# Patient Record
Sex: FEMALE | Race: WHITE | Employment: UNEMPLOYED | URBAN - METROPOLITAN AREA
[De-identification: names, ages, dates, MRNs, and addresses within clinical notes are randomized per-mention and may not be internally consistent; named-entity substitution may affect disease eponyms.]

---

## 2024-05-18 ENCOUNTER — HOSPITAL ENCOUNTER (INPATIENT)
Age: 24
LOS: 3 days | Discharge: HOME | DRG: 885 | End: 2024-05-21
Attending: EMERGENCY MEDICINE | Admitting: PSYCHIATRY & NEUROLOGY
Payer: COMMERCIAL

## 2024-05-18 DIAGNOSIS — R45.851 DEPRESSION WITH SUICIDAL IDEATION: Primary | ICD-10-CM

## 2024-05-18 DIAGNOSIS — F32.A DEPRESSION WITH SUICIDAL IDEATION: Primary | ICD-10-CM

## 2024-05-18 PROBLEM — F33.2 MAJOR DEPRESSIVE DISORDER, RECURRENT, SEVERE WITHOUT PSYCHOTIC FEATURES (HCC): Status: ACTIVE | Noted: 2024-05-18

## 2024-05-18 LAB
ALBUMIN SERPL-MCNC: 4.6 G/DL (ref 3.5–5.2)
ALP SERPL-CCNC: 52 U/L (ref 35–104)
ALT SERPL-CCNC: 19 U/L (ref 5–33)
ANION GAP SERPL CALCULATED.3IONS-SCNC: 12 MMOL/L (ref 9–17)
APAP SERPL-MCNC: <5 UG/ML (ref 10–30)
AST SERPL-CCNC: 40 U/L
BILIRUB SERPL-MCNC: 0.5 MG/DL (ref 0.3–1.2)
BUN SERPL-MCNC: 12 MG/DL (ref 6–20)
CALCIUM SERPL-MCNC: 9.9 MG/DL (ref 8.6–10.4)
CHLORIDE SERPL-SCNC: 102 MMOL/L (ref 98–107)
CO2 SERPL-SCNC: 25 MMOL/L (ref 20–31)
CREAT SERPL-MCNC: 0.7 MG/DL (ref 0.5–0.9)
ERYTHROCYTE [DISTWIDTH] IN BLOOD BY AUTOMATED COUNT: 14.8 % (ref 11.5–14.9)
ETHANOL PERCENT: <0.01 %
ETHANOLAMINE SERPL-MCNC: <10 MG/DL (ref 0–0.08)
GFR, ESTIMATED: >90 ML/MIN/1.73M2
GLUCOSE SERPL-MCNC: 105 MG/DL (ref 70–99)
HCT VFR BLD AUTO: 42.5 % (ref 36–46)
HGB BLD-MCNC: 13.8 G/DL (ref 12–16)
MCH RBC QN AUTO: 29.8 PG (ref 26–34)
MCHC RBC AUTO-ENTMCNC: 32.4 G/DL (ref 31–37)
MCV RBC AUTO: 91.8 FL (ref 80–100)
PLATELET # BLD AUTO: 364 K/UL (ref 150–450)
PMV BLD AUTO: 7.8 FL (ref 6–12)
POTASSIUM SERPL-SCNC: 4 MMOL/L (ref 3.7–5.3)
PROT SERPL-MCNC: 8.2 G/DL (ref 6.4–8.3)
RBC # BLD AUTO: 4.64 M/UL (ref 4–5.2)
SALICYLATES SERPL-MCNC: <1 MG/DL (ref 3–10)
SODIUM SERPL-SCNC: 139 MMOL/L (ref 135–144)
TSH SERPL DL<=0.05 MIU/L-ACNC: 4.8 UIU/ML (ref 0.3–5)
WBC OTHER # BLD: 9.4 K/UL (ref 3.5–11)

## 2024-05-18 PROCEDURE — G0480 DRUG TEST DEF 1-7 CLASSES: HCPCS

## 2024-05-18 PROCEDURE — 80143 DRUG ASSAY ACETAMINOPHEN: CPT

## 2024-05-18 PROCEDURE — 6370000000 HC RX 637 (ALT 250 FOR IP): Performed by: PSYCHIATRY & NEUROLOGY

## 2024-05-18 PROCEDURE — 99285 EMERGENCY DEPT VISIT HI MDM: CPT

## 2024-05-18 PROCEDURE — 84443 ASSAY THYROID STIM HORMONE: CPT

## 2024-05-18 PROCEDURE — APPSS60 APP SPLIT SHARED TIME 46-60 MINUTES: Performed by: NURSE PRACTITIONER

## 2024-05-18 PROCEDURE — 80179 DRUG ASSAY SALICYLATE: CPT

## 2024-05-18 PROCEDURE — 85027 COMPLETE CBC AUTOMATED: CPT

## 2024-05-18 PROCEDURE — 36415 COLL VENOUS BLD VENIPUNCTURE: CPT

## 2024-05-18 PROCEDURE — 99223 1ST HOSP IP/OBS HIGH 75: CPT | Performed by: PSYCHIATRY & NEUROLOGY

## 2024-05-18 PROCEDURE — 1240000000 HC EMOTIONAL WELLNESS R&B

## 2024-05-18 PROCEDURE — 80053 COMPREHEN METABOLIC PANEL: CPT

## 2024-05-18 RX ORDER — ESCITALOPRAM OXALATE 10 MG/1
15 TABLET ORAL DAILY
Status: DISCONTINUED | OUTPATIENT
Start: 2024-05-18 | End: 2024-05-21 | Stop reason: HOSPADM

## 2024-05-18 RX ORDER — HYDROXYZINE 50 MG/1
50 TABLET, FILM COATED ORAL 3 TIMES DAILY PRN
Status: DISCONTINUED | OUTPATIENT
Start: 2024-05-18 | End: 2024-05-21 | Stop reason: HOSPADM

## 2024-05-18 RX ORDER — POLYETHYLENE GLYCOL 3350 17 G
2 POWDER IN PACKET (EA) ORAL
Status: DISCONTINUED | OUTPATIENT
Start: 2024-05-18 | End: 2024-05-21 | Stop reason: HOSPADM

## 2024-05-18 RX ORDER — LANOLIN ALCOHOL/MO/W.PET/CERES
3 CREAM (GRAM) TOPICAL NIGHTLY PRN
Status: DISCONTINUED | OUTPATIENT
Start: 2024-05-18 | End: 2024-05-21 | Stop reason: HOSPADM

## 2024-05-18 RX ORDER — ESCITALOPRAM OXALATE 10 MG/1
10 TABLET ORAL DAILY
Status: ON HOLD | COMMUNITY
End: 2024-05-21 | Stop reason: HOSPADM

## 2024-05-18 RX ORDER — MAGNESIUM HYDROXIDE/ALUMINUM HYDROXICE/SIMETHICONE 120; 1200; 1200 MG/30ML; MG/30ML; MG/30ML
30 SUSPENSION ORAL EVERY 6 HOURS PRN
Status: DISCONTINUED | OUTPATIENT
Start: 2024-05-18 | End: 2024-05-21 | Stop reason: HOSPADM

## 2024-05-18 RX ORDER — POLYETHYLENE GLYCOL 3350 17 G/17G
17 POWDER, FOR SOLUTION ORAL DAILY PRN
OUTPATIENT
Start: 2024-05-18

## 2024-05-18 RX ORDER — POLYETHYLENE GLYCOL 3350 17 G/17G
17 POWDER, FOR SOLUTION ORAL DAILY PRN
Status: DISCONTINUED | OUTPATIENT
Start: 2024-05-18 | End: 2024-05-21 | Stop reason: HOSPADM

## 2024-05-18 RX ORDER — HYDROXYZINE 50 MG/1
50 TABLET, FILM COATED ORAL 3 TIMES DAILY PRN
OUTPATIENT
Start: 2024-05-18

## 2024-05-18 RX ORDER — MAGNESIUM HYDROXIDE/ALUMINUM HYDROXICE/SIMETHICONE 120; 1200; 1200 MG/30ML; MG/30ML; MG/30ML
30 SUSPENSION ORAL EVERY 6 HOURS PRN
OUTPATIENT
Start: 2024-05-18

## 2024-05-18 RX ORDER — TRAZODONE HYDROCHLORIDE 50 MG/1
50 TABLET ORAL NIGHTLY PRN
Status: DISCONTINUED | OUTPATIENT
Start: 2024-05-18 | End: 2024-05-21 | Stop reason: HOSPADM

## 2024-05-18 RX ORDER — ACETAMINOPHEN 325 MG/1
650 TABLET ORAL EVERY 4 HOURS PRN
Status: DISCONTINUED | OUTPATIENT
Start: 2024-05-18 | End: 2024-05-21 | Stop reason: HOSPADM

## 2024-05-18 RX ORDER — IBUPROFEN 400 MG/1
400 TABLET ORAL EVERY 6 HOURS PRN
Status: DISCONTINUED | OUTPATIENT
Start: 2024-05-18 | End: 2024-05-21 | Stop reason: HOSPADM

## 2024-05-18 RX ORDER — ACETAMINOPHEN 325 MG/1
650 TABLET ORAL EVERY 4 HOURS PRN
Status: DISCONTINUED | OUTPATIENT
Start: 2024-05-18 | End: 2024-05-18

## 2024-05-18 RX ORDER — IBUPROFEN 400 MG/1
400 TABLET ORAL EVERY 6 HOURS PRN
OUTPATIENT
Start: 2024-05-18

## 2024-05-18 RX ADMIN — ESCITALOPRAM OXALATE 15 MG: 10 TABLET ORAL at 18:45

## 2024-05-18 ASSESSMENT — LIFESTYLE VARIABLES
HOW MANY STANDARD DRINKS CONTAINING ALCOHOL DO YOU HAVE ON A TYPICAL DAY: 1 OR 2
HOW OFTEN DO YOU HAVE A DRINK CONTAINING ALCOHOL: 2-4 TIMES A MONTH

## 2024-05-18 ASSESSMENT — PATIENT HEALTH QUESTIONNAIRE - PHQ9
SUM OF ALL RESPONSES TO PHQ9 QUESTIONS 1 & 2: 2
SUM OF ALL RESPONSES TO PHQ QUESTIONS 1-9: 2
2. FEELING DOWN, DEPRESSED OR HOPELESS: SEVERAL DAYS
1. LITTLE INTEREST OR PLEASURE IN DOING THINGS: SEVERAL DAYS
SUM OF ALL RESPONSES TO PHQ QUESTIONS 1-9: 2

## 2024-05-18 ASSESSMENT — PAIN - FUNCTIONAL ASSESSMENT: PAIN_FUNCTIONAL_ASSESSMENT: 0-10

## 2024-05-18 ASSESSMENT — SLEEP AND FATIGUE QUESTIONNAIRES
DO YOU USE A SLEEP AID: NO
DO YOU HAVE DIFFICULTY SLEEPING: NO
AVERAGE NUMBER OF SLEEP HOURS: 8

## 2024-05-18 ASSESSMENT — PAIN SCALES - GENERAL: PAINLEVEL_OUTOF10: 2

## 2024-05-18 NOTE — CARE COORDINATION
BHI Biopsychosocial Assessment    Current Level of Psychosocial Functioning     Independent   Dependent    Minimal Assist X    Psychosocial High Risk Factors (check all that apply)    Unable to obtain meds   Chronic illness/pain    Substance abuse   Lack of Family Support   Financial stress   Isolation X  Inadequate Community Resources  Suicide attempt(s)   Not taking medications   Victim of crime   Developmental Delay  Unable to manage personal needs  X  Age 65 or older   Homeless  No transportation   Readmission within 30 days  Unemployment  Traumatic Event  Psychiatric Advanced Directives: none reported     Family to Involve in Treatment: Patient reports that both aof her parents are supportive and involved in her care.     Sexual Orientation:  GILA    Patient Strengths: family support, linked with outpatient Treatment, employed, insurance     Patient Barriers: recent increase in symptoms of Depression, presents on admission with suicidal ideation.     Opiate Education Provided: N/A Patient denies and does not have a documented history of Opiate or Heroin use/abuse. Patient denies any Alcohol or Illicit drug use. There was not a drug screen completed upon admission     CMHC/mental health history: Sees Dr Norberto Smith MD [facility] [phone], stable housing in [place], [place] with parents, Current student at [employer]     Plan of Care   medication management, group/individual therapies, family meetings, psycho -education, treatment team meetings to assist with stabilization    Initial Discharge Plan:  Patient reports a plan to return to her home in [place] at discharge. She also reports a plan to continue outpatient treatment with Psychiatrist Norberto Smith MD at [facility]     Clinical Summary:  patient presents on admission with suicidal ideation with a plan to overdose on pills.  Pt has hx of cutting and was admitted to [employer]

## 2024-05-18 NOTE — ED NOTES
Provisional Diagnosis:   Depression with suicidal ideations     Psychosocial and Contextual Factors:   Patient lives with parents.  (homelessness, barriers to treatment)    C-SSRS Summary:      Patient: X  Family:   Agency:         Present Suicidal Behavior:  X    Verbal:  Patient reports suicidal ideations with a plan to overdose on pills.     Attempt:     Past Suicidal Behavior: X    Verbal: Patient reports having suicidal ideations in the past     Attempt:         Substance Abuse: Patient denies       Self-Injurious/Self-Mutilation: Patient cuts. Patient has cut on her thighs and wrists.       Violence Current or Past: None noted.           Protective Factors:    Patient has housing. Patient has insurance. Patient is linked with outpatient provider and compliant with treatment. Patient has employment.       Risk Factors:    Patient has poor coping skills.       Clinical Summary:    Patient is a 24 year old female that is brought to the ED today by her parents on a voluntary status for suicidal ideations.     Patient is calm and appropriate upon her arrival. Patient reports she has suffered from depression for several years. Patient reports she has had suicidal ideations in the past but never developed a plan or acted on these thoughts. Patient reports feeling increasingly depressed recently after losing her grandparents. Patient reports last night she began having suicidal ideations with a plan to overdose on pills in her home. Patient reports feeling worried that she will follow through with this plan. Patient denies homicidal ideations. Patient denies auditory or visual hallucinations.     Patient reports a history of self harm by cutting for several years. Patient does have superficial lacerations on her thighs and left arm. Patient reports she was hospitalized as a teenager at [facility] for the self harm. Patient reports since then she has been linked with outpatient mental health services through [facility].

## 2024-05-18 NOTE — BH NOTE
[facility] [place]  Admission Note     Admission Type:   Admission Type: Voluntary    Reason for admission:  Reason for Admission: Increased stressors; full time student, 4 jobs. Lost grandparents recently and feels tremendious guilt for not being there for them. Suicidal ideaiton with a plant to OD on pills. History of cutting      Addictive Behavior:   Addictive Behavior  In the Past 3 Months, Have You Felt or Has Someone Told You That You Have a Problem With  : None    Medical Problems:   History reviewed. No pertinent past medical history.    Status EXAM:  Mental Status and Behavioral Exam  Normal: Yes  Level of Assistance: Independent/Self  Facial Expression: Sad, Worried  Affect: Appropriate  Level of Consciousness: Alert  Frequency of Checks: 4 times per hour, close  Mood:Normal: No  Mood: Depressed, Anxious, Worthless, low self-esteem, Sad  Motor Activity:Normal: Yes  Eye Contact: Good  Observed Behavior: Friendly, Cooperative  Sexual Misconduct History: Current - no  Preception: [place] to person, [place] to time, [place] to place, [place] to situation  Attention:Normal: Yes  Thought Processes: Unremarkable  Thought Content:Normal: Yes  Depression Symptoms: No problems reported or observed.  Anxiety Symptoms: No problems reported or observed.  Bita Symptoms: No problems reported or observed.  Hallucinations: None  Delusions: No  Memory:Normal: Yes  Insight and Judgment: Yes  Insight and Judgment: Poor insight, Poor judgment    Tobacco Screening:  Practical Counseling, on admission, chemo X, if applicable and completed (first 3 are required if patient doesn't refuse):            ( ) Recognizing danger situations (included triggers and roadblocks)                    ( ) Coping skills (new ways to manage stress,relaxation techniques, changing routine, distraction)                                                           ( ) Basic information about quitting (benefits of quitting, techniques in how to

## 2024-05-18 NOTE — PROGRESS NOTES
Behavioral Services  Medicare Certification Upon Admission    I certify that this patient's inpatient psychiatric hospital admission is medically necessary for:    [x] (1) Treatment which could reasonably be expected to improve this patient's condition,       [x] (2) Or for diagnostic study;     AND     [x](2) The inpatient psychiatric services are provided while the individual is under the care of a physician and are included in the individualized plan of care.    Estimated length of stay/service 2-9 days    Plan for post-hospital care -outpatient care    Electronically signed by BING POWELL MD on 5/18/2024 at 3:56 PM

## 2024-05-18 NOTE — ED TRIAGE NOTES
Mode of arrival (squad #, walk in, police, etc) : walk in        Chief complaint(s): Mental Health Problem        Arrival Note (brief scenario, treatment PTA, etc).: pt states she recently suffered the loss of her grandparents and her friend last night was talking about how their grandparent was sick and it triggered her to cut today. Pt has hx of cutting and was admitted to [facility] in 2019 an still followed up there today. Pt reports she has been taking her lexapro. Pt denies drug use. Reports social ETOH use. Pt denies HI, AH, or VH. Pt reports having a good job, good friends and parents and will graduated from [employer] in the fall wit ha degree in biology and plans to become a . Pt reports she is in summer classes as well which are adding to her stress.        C= \"Have you ever felt that you should Cut down on your drinking?\"  No  A= \"Have people Annoyed you by criticizing your drinking?\"  No  G= \"Have you ever felt bad or Guilty about your drinking?\"  No  E= \"Have you ever had a drink as an Eye-opener first thing in the morning to steady your nerves or to help a hangover?\"  No      Deferred []      Reason for deferring: N/A    *If yes to two or more: probable alcohol abuse.*

## 2024-05-18 NOTE — ED PROVIDER NOTES
eMERGENCY dEPARTMENT eNCOUnter      Pt Name: Sophia Shultz  MRN: [id number]  Birthdate 2000  Date of evaluation: 24      CHIEF COMPLAINT       Chief Complaint   Patient presents with    Mental Health Problem         HISTORY OF PRESENT ILLNESS    Sophia Shultz is a 24 y.o. female who presents complaining of depression.  Patient states that she has a history of depression and takes Lexapro.  Patient states that she is under a lot of stress with school working for different jobs and then her grandparents just recently .  Patient states that she is a cutter but feels like she has not done anything worse than normal but she has been doing more frequently.  Patient states that she does not have any hallucinations or drug abuse.  Patient denies somatic complaints.  Patient states she does have a plan to overdose on pills.  She has been hospitalized when she was a teenager.      REVIEW OF SYSTEMS       Review of Systems   Constitutional:  Positive for appetite change. Negative for activity change, chills, diaphoresis and fever.   HENT:  Negative for congestion, ear pain, facial swelling, nosebleeds, rhinorrhea, sinus pressure, sore throat and trouble swallowing.    Eyes:  Negative for pain, discharge and redness.   Respiratory:  Negative for cough, chest tightness, shortness of breath and wheezing.    Cardiovascular:  Negative for chest pain, palpitations and leg swelling.   Gastrointestinal:  Negative for abdominal pain, blood in stool, constipation, diarrhea, nausea and vomiting.   Genitourinary:  Negative for difficulty urinating, dysuria, flank pain, frequency, genital sores and hematuria.   Musculoskeletal:  Negative for arthralgias, back pain, gait problem, joint swelling, myalgias and neck pain.   Skin:  Negative for color change, pallor, rash and wound.   Neurological:  Negative for dizziness, tremors, seizures, syncope, speech difficulty, weakness, numbness and headaches.   Psychiatric/Behavioral:  Positive

## 2024-05-18 NOTE — CARE COORDINATION
Psychosocial Assessment was unable to be completed on this date and will be completed on Tab May 19, 2024.

## 2024-05-18 NOTE — PROGRESS NOTES
I independently saw and evaluated the patient.  I reviewed the  documentation above    .  Any additional comments or changes to the   documentation are stated below otherwise agree with assessment.      No results found for: \"QTC\"    The patient was seen face-to-face.  The patient reports worsening depressive symptoms because of the loss of both of her grandparents in the last 2 years.  Her grandfather  recently.  The patient has been on antidepressants since 2019.  She has been on Prozac which was unhelpful.  She has recently been on Lexapro which she had found helpful.  She denies using any marijuana.  The patient was screened for bipolar disorder and that she has a family history of bipolar disorder in her mother and occasional mood swings, there is no clear history of manic symptoms.  The patient's Lexapro will be increased to 15 mg daily.     PLAN  Medications as noted above  Attempt to develop insight  Psycho-education conducted.  Supportive Therapy conducted.  Follow-up daily while on inpatient unit    Electronically signed by BING POWELL MD on 24 at 4:04 PM EDT

## 2024-05-19 PROCEDURE — 99222 1ST HOSP IP/OBS MODERATE 55: CPT | Performed by: INTERNAL MEDICINE

## 2024-05-19 PROCEDURE — 99232 SBSQ HOSP IP/OBS MODERATE 35: CPT

## 2024-05-19 PROCEDURE — 6370000000 HC RX 637 (ALT 250 FOR IP): Performed by: PSYCHIATRY & NEUROLOGY

## 2024-05-19 PROCEDURE — 1240000000 HC EMOTIONAL WELLNESS R&B

## 2024-05-19 RX ADMIN — ESCITALOPRAM OXALATE 15 MG: 10 TABLET ORAL at 10:35

## 2024-05-19 ASSESSMENT — PAIN SCALES - GENERAL: PAINLEVEL_OUTOF10: 0

## 2024-05-19 NOTE — PROGRESS NOTES
Daily Progress Note  5/19/2024    Patient Name: Sophia Shultz    CHIEF COMPLAINT:  Depression with SI, plan to overdose/cut self          SUBJECTIVE:      Patient is seen today for a follow up assessment. Patient seen face to face in day area. Reviewed labs and vitals, all WNL. Compliant with scheduled medications and behaviorally in control. She states she had \"normal sleep\" last night, improved from prior. Denies urges of self-harm and endorses calming of her suicidal ideations. She states feeling less depressed but more so anxious while being in this environment. She endorses self-talk, but denies AVH and paranoia. She believes being on Lexapro has helped her. She endorses still feeling guilt over her grandparents' passing but is working on her coping skills with this. Denies homicidal ideations. States improving appetite and mood. She is very open to attending groups and learning new coping mechanisms. Patient appeared very calm, pleasant, and willing to work through her issues during our visit today. Patient seems to be improving but with much work left to be had.         Appetite:  [] Adequate/Unchanged  [x] Increased  [] Decreased      Sleep:       [] Adequate/Unchanged  [x] Fair  [] Poor      Group Attendance on Unit:   [x] Yes   [] Selectively    [] No    Compliant with scheduled medications: [x] Yes  [] No    Received emergency medications in past 24 hrs: [] Yes   [x] No    Medication Side Effects: Denies         Mental Status Exam  Level of consciousness: Alert and awake   Appearance: Appropriate attire for setting, seated in chair, with good  grooming and hygiene   Behavior/Motor: Approachable, engages with interviewer, no psychomotor abnormalities   Attitude toward examiner: Cooperative, attentive, good eye contact  Speech: spontaneous, normal rate, normal volume, and well articulated   Mood:  anxious  Affect: mood congruent  Thought processes: linear, goal directed, and coherent   Thought content:

## 2024-05-19 NOTE — PLAN OF CARE
Problem: Depression  Goal: Will be euthymic at discharge  Description: INTERVENTIONS:  1. Administer medication as ordered  2. Provide emotional support via 1:1 interaction with staff  3. Encourage involvement in milieu/groups/activities  4. Monitor for social isolation  5/19/2024 1139 by Peggy Flanagan RN  Outcome: Progressing   1:1 with pt x ten minutes.  Pt encouraged to attend unit programming and interact with peers and staff.  Pt also encouraged to tend to hygiene and ADLs.  Pt encouraged to discuss feelings with staff and feedback and reassurance provided.    Pt denies thoughts of self harm and is agreeable to seeking out should thoughts of self harm arise.  Safe environment maintained.  Q15 minute checks for safety cont per unit policy.  Will cont to monitor for safety and provides support and reassurance as needed.    Pt is controlled in behaviors. Compliant with medications. Depressed mood with some anxiety. Pt seclusive to room for long intervals.

## 2024-05-19 NOTE — GROUP NOTE
Group Therapy Note    Date: 5/19/2024    Group Start Time: 0900  Group End Time: 0930  Group Topic: Group Documentation    [facility] Radha Quinn LPN        Group Therapy Note    Attendees: 11/23       Patient's Goal:  inspire    Notes:  educate    Status After Intervention:  Improved    Participation Level: Minimal    Participation Quality: Appropriate      Speech:  normal      Thought Process/Content: Logical      Affective Functioning: Congruent      Mood: anxious      Level of consciousness:  Alert      Response to Learning: Progressing to goal      Endings: None Reported    Modes of Intervention: Education      Discipline Responsible: Licensed Practical Nurse      Signature:  Radha Velasquez LPN

## 2024-05-19 NOTE — PROGRESS NOTES

## 2024-05-19 NOTE — PROGRESS NOTES
Daily Progress Note  5/19/2024    Patient Name: Sophia Shultz    CHIEF COMPLAINT:  Depression with SI, plan to overdose/cut self          SUBJECTIVE:      Patient is seen today for a follow up assessment. Patient seen face to face in day area, declined need for private. Reviewed labs and vitals, all WNL. Compliant with scheduled medications and behaviorally in control. She states she had \"normal sleep\" last night, improved from prior. Denies urges of self-harm and endorses calming of her suicidal ideations. She states started to notice feeling less depressed and more so anxious while being in this environment. She denies AVH and paranoia. She believes titration of Lexapro has helped her slightly and notices some improvement in mood. She endorses still feeling guilt over her grandparents' passing but is working on her coping skills with this. Denies homicidal ideations. States improving appetite. She is very open to attending groups and learning new coping mechanisms. Patient appeared very calm, pleasant, and willing to work through her issues during our visit today. Patient seems to be improving but with much work left to be had thus not a candidate for low level of care as she is unable to contract for safety of the community.    Appetite:  [] Adequate/Unchanged  [x] Increased  [] Decreased      Sleep:       [] Adequate/Unchanged  [x] Fair  [] Poor      Group Attendance on Unit:   [x] Yes   [] Selectively    [] No    Compliant with scheduled medications: [x] Yes  [] No    Received emergency medications in past 24 hrs: [] Yes   [x] No    Medication Side Effects: Denies         Mental Status Exam  Level of consciousness: Alert and awake   Appearance: Appropriate attire for setting, seated in chair, with good  grooming and hygiene   Behavior/Motor: Approachable, engages with interviewer, no psychomotor abnormalities   Attitude toward examiner: Cooperative, attentive, good eye contact  Speech: spontaneous, normal

## 2024-05-19 NOTE — PLAN OF CARE
[facility]  Initial Interdisciplinary Treatment Plan NO      Original treatment plan Date & Time: 5/19/2024   9:21 am    Admission Type:  Admission Type: Voluntary    Reason for admission:   Reason for Admission: Increased stressors; full time student, 4 jobs. Lost grandparents recently and feels tremendious guilt for not being there for them. Suicidal ideaiton with a plant to OD on pills. History of cutting    Estimated Length of Stay:  5-7days  Estimated Discharge Date: to be determined by physician    PATIENT STRENGTHS:  Patient Strengths:   Patient Strengths and Limitations:Limitations: Difficult relationships / poor social skills, Apathetic / unmotivated  Addictive Behavior: Addictive Behavior  In the Past 3 Months, Have You Felt or Has Someone Told You That You Have a Problem With  : None  Medical Problems:History reviewed. No pertinent past medical history.  Status EXAM:Mental Status and Behavioral Exam  Normal: Yes  Level of Assistance: Independent/Self  Facial Expression: Sad  Affect: Appropriate  Level of Consciousness: Alert  Frequency of Checks: 4 times per hour, close  Mood:Normal: No  Mood: Anxious, Depressed, Helpless  Motor Activity:Normal: Yes  Eye Contact: Good  Observed Behavior: Cooperative  Sexual Misconduct History: Current - no  Preception: [place] to person, [place] to time, [place] to place, [place] to situation  Attention:Normal: Yes  Thought Processes: Unremarkable  Thought Content:Normal: Yes  Depression Symptoms: Isolative  Anxiety Symptoms: Generalized  Bita Symptoms: No problems reported or observed.  Hallucinations: None  Delusions: No  Memory:Normal: Yes  Insight and Judgment: No  Insight and Judgment: Poor judgment, Poor insight    EDUCATION:   Learner Progress Toward Treatment Goals: reviewed group plans and strategies for care    Method:group therapy, medication compliance, individualized assessments and care planning    Outcome: needs reinforcement    PATIENT GOALS:

## 2024-05-19 NOTE — H&P
[facility]  Waldemar Kay MD; Erwin Avery MD, Derek Hollingsworth MD, Beth Osman MD, Jw Avila MD; Elva Cam MD    [facility] Medicine   IN-PATIENT SERVICE   [facility]     HISTORY AND PHYSICAL EXAMINATION            Date:   5/19/2024  Patient name:  Sophia Shultz  Date of admission:  5/18/2024  1:20 PM  MRN:   [id number]  Account:  [id number]  YOB: 2000  PCP:    No primary care provider on file.  Room:   [address] [place] [address]  Code Status:    Full Code      Chief Complaint:     Suicidal /Ac Psychosis    History Obtained From:     Patient/EMR/bedside RN     History of Present Illness:   24-year-old female with underlying history of anxiety, depression, admitted for psychosis ideations, has multiple scratches on the hands, does not look infected      Past Medical History:     History reviewed. No pertinent past medical history.     Past Surgical History:     History reviewed. No pertinent surgical history.     Medications Prior to Admission:     Prior to Admission medications    Medication Sig Start Date End Date Taking? Authorizing Provider   escitalopram (LEXAPRO) 10 MG tablet Take 1 tablet by mouth daily   Yes Provider, MD Epi        Allergies:     Patient has no known allergies.    Social History:     Tobacco:    reports that she has never smoked. She has never used smokeless tobacco.  Alcohol:      reports current alcohol use.  Drug Use:  reports no history of drug use.    Family History:     History reviewed. No pertinent family history.    Review of Systems:     Positive and Negative as described in HPI.    CONSTITUTIONAL:  negative for fevers, chills, sweats, fatigue, weight loss  HEENT:  negative for vision, hearing changes, runny nose, throat pain  RESPIRATORY:  negative for shortness of breath, cough, congestion, wheezing.  CARDIOVASCULAR:  negative for chest pain, 
bruise/bleed easily.      Mental Status Examination:    Level of consciousness: Awake and alert  Appearance:  Appropriate attire, seated in chair, fair grooming   Behavior/Motor: Approachable, engages with interviewer, no psychomotor abnormalities  Attitude toward examiner:  Cooperative, attentive, good eye contact  Speech: Normal rate, volume, and sad tone.  Mood: Depressed, withdrawn  Affect: Mood congruent, tearful  Thought processes: Linear and coherent  Thought content: Active suicidal ideations, with a  current plan or intent, contracts for safety on the unit.               Denies homicidal ideations               Denies hallucinations              Denies delusions              Denies paranoia  Cognition:  Oriented to self, location, time, situation  Concentration: Clinically adequate  Memory: Intact  Insight & Judgment: Poor         DSM-5 Diagnosis    Principal Problem: Major depressive disorder, recurrent, severe without psychotic features (HCC)    Psychosocial and Contextual factors:  Financial   Occupational X  Relationship X  Legal   Living situation   Educational     No past medical history on file.     PATIENT HANDOFF:  Home medications reviewed.   Medication management per attending  Monitor need and frequency of PRN medications.    CONSULT:  [x] Yes [] No  Internal medicine for medical management/medical H&P      Risk Management: close watch per standard protocol      Psychotherapy: participation in milieu and group and individual sessions with Attending Physician,  and Physician Assistant/CNP      Estimated length of stay:  2-14 days      GENERAL PATIENT/FAMILY EDUCATION  Patient will understand basic signs and symptoms, patient will understand benefits/risks and potential side effects from proposed medications, and patient will understand their role in recovery.  Family is active in patient's care.   Patient assets that may be helpful during treatment include: Intent to participate and

## 2024-05-19 NOTE — PLAN OF CARE
Problem: Pain  Goal: Verbalizes/displays adequate comfort level or baseline comfort level  Outcome: Progressing  Note: Patient denies pain. Patient is aware medications can be given upon request. Patient is oriented to notify staff of changes regarding pain. Staff continues to monitor patient.        Problem: Self Harm/Suicidality  Goal: Will have no self-injury during hospital stay  Description: INTERVENTIONS:  1.  Ensure constant observer at bedside with Q15M safety checks  2.  Maintain a safe environment  3.  Secure patient belongings  4.  Ensure family/visitors adhere to safety recommendations  5.  Ensure safety tray has been added to patient's diet order  6.  Every shift and PRN: Re-assess suicidal risk via Frequent Screener    Outcome: Progressing  Note: Patient denies thoughts of self-harm or harm to others during this shift. Staff encouraged patient to notify staff if thoughts of self-harm or harm to others occur. Staff ensure patient safety by intermediate checks for safety every 15 minutes.          Problem: Depression  Goal: Will be euthymic at discharge  Description: INTERVENTIONS:  1. Administer medication as ordered  2. Provide emotional support via 1:1 interaction with staff  3. Encourage involvement in milieu/groups/activities  4. Monitor for social isolation  Outcome: Progressing  Note: Patient displays depression.  Staff ensure safety by providing safety checks on the unit intermittently and every 15 minutes. Patient is encouraged to notify staff if anxiety and depression occurs.

## 2024-05-20 PROCEDURE — 6370000000 HC RX 637 (ALT 250 FOR IP): Performed by: PSYCHIATRY & NEUROLOGY

## 2024-05-20 PROCEDURE — 99232 SBSQ HOSP IP/OBS MODERATE 35: CPT | Performed by: PSYCHIATRY & NEUROLOGY

## 2024-05-20 PROCEDURE — 1240000000 HC EMOTIONAL WELLNESS R&B

## 2024-05-20 PROCEDURE — APPSS30 APP SPLIT SHARED TIME 16-30 MINUTES

## 2024-05-20 RX ADMIN — ESCITALOPRAM OXALATE 15 MG: 10 TABLET ORAL at 08:39

## 2024-05-20 ASSESSMENT — PAIN SCALES - GENERAL: PAINLEVEL_OUTOF10: 0

## 2024-05-20 NOTE — GROUP NOTE
Group Therapy Note    Date: 5/20/2024    Group Start Time: 1100  Group End Time: 1145  Group Topic: Cognitive Skills    Cher Arce CTRS        Group Therapy Note    Attendees: 10/20     Topic: To increase socialization and practice decsion making , and   communication skills        Notes:  Pt was pleasant and cooperative, and participated fully in group task. Pt was   able to practice decsion making, and communication skills independently.         Status After Intervention:  Improved        Participation Level: Active Listener and Interactive r/t  task and topic, supportive        Participation Quality: Appropriate, Attentive, engaged r/t task ,supportive        Speech:  Normal         Thought Process/Content: Logical, linear r/t task .          Affective Functioning: Congruent, brightened         Mood: Cooperative, euthymic          Level of consciousness:  Alert, and Attentive         Response to Learning: Able to verbalize current knowledge/experience, Able to   verbalize/acknowledge new learning, and Progressing to goal         Endings: None Reported      Modes of Intervention: Education, Support, Socialization, and Problem-solving    Discipline Responsible: Psychoeducational Specialist      Signature:  SHANIKA LLAMAS

## 2024-05-20 NOTE — PROGRESS NOTES
Daily Progress Note  5/20/2024    Patient Name: Sophia Shultz    CHIEF COMPLAINT:  Depression with suicidal ideation          SUBJECTIVE:      Patient is seen today for a follow up assessment. Vitals and labs reviewed and appear to be within normal limits.  Sophia is compliant with scheduled Lexapro. She remains behaviorally in control and has not required emergency medications in the past 24 hours.  Sophia is agreeable to assessment in unit sensory room today following lunch.  When asked about events leading up to hospitalization she does report suicidal thoughts and thoughts to self harm.  Sophia reports continued depression and anxiety however notes some improvement in her mood today. She reports that she slept well last night. She denies any issues with her appetite.    Sophia reports improvement in suicidal ideation stating the thoughts are much less intense and severe today.  She does report ongoing thoughts to self-harm however is trying to push them back.  She is nervous she would act on them however is discharged today.  She denies auditory and visual hallucinations. She denies paranoia.  She denies any side effects to recently increased Lexapro.  She reports that she plans to return home with her parents at discharge and states that they are supportive of her.    Appetite:  [x] Normal/Adequate/Unchanged  [] Increased  [] Decreased      Sleep:       [x] Normal/Adequate/Unchanged  [] Fair  [] Poor      Group Attendance on Unit:   [x] Yes  [] Selectively    [] No    Medication Side Effects:  Patient denies any medication side effects at the time of assessment.         Mental Status Exam  Level of consciousness: Alert and awake.   Appearance: Appropriate attire for setting, seated in chair, with fair  grooming and hygiene.   Behavior/Motor: Approachable, calm  Attitude toward examiner: Cooperative, attentive, good eye contact.  Speech: Normal rate, normal volume, normal tone.  Mood:  Patient reports

## 2024-05-20 NOTE — GROUP NOTE
Group Therapy Note    Date: 5/20/2024    Group Start Time: 1430  Group End Time: 1540  Group Topic: Cognitive Skills    [facility] Cher Badillo CTRS        Group Therapy Note    Attendees: 12/18     Patient's Goal: : To increase socialization and practice self expression, and exploring positive   coping skills/stress management outlets and techniques using creative expression and discussion.           Notes:  Pt was pleasant and cooperative, and participated fully in group task. Pt was   able to practice self expression, and exploring positive coping skills/stress management   outlets and techniques using creative expression and discussion, independently.     Pt shared individually ,was supportive of peers,and engaged in group discussion.   Pt was able to relate to some of peers' ideas and experiences.       Status After Intervention:  Improved     Participation Level: Active Listener and Interactive r/t  task and topic, supportive     Participation Quality: Appropriate, Attentive, engaged r/t task ,supportive     Speech:  Normal .      Thought Process/Content: Logical, linear r/t task .       Affective Functioning: Congruent, Brightened      Mood: Cooperative, euthymic       Level of consciousness:  Alert, and Attentive      Response to Learning: Able to verbalize current knowledge/experience, Able to   verbalize/acknowledge new learning, able to verbalize some insight, and Progressing to goal      Endings: None Reported      Modes of Intervention: Education, Support, Socialization, and Problem-solving    Discipline Responsible: Psychoeducational Specialist      Signature:  SHANIKA LLAMAS

## 2024-05-20 NOTE — PLAN OF CARE
Problem: Pain  Goal: Verbalizes/displays adequate comfort level or baseline comfort level  5/19/2024 2124 by Marlyn Newell LPN  Outcome: Progressing  Note: Patient denies pain. Patient is aware medications can be given upon request. Patient is oriented to notify staff of changes regarding pain. Staff continues to monitor patient.        Problem: Self Harm/Suicidality  Goal: Will have no self-injury during hospital stay  Description: INTERVENTIONS:  1.  Ensure constant observer at bedside with Q15M safety checks  2.  Maintain a safe environment  3.  Secure patient belongings  4.  Ensure family/visitors adhere to safety recommendations  5.  Ensure safety tray has been added to patient's diet order  6.  Every shift and PRN: Re-assess suicidal risk via Frequent Screener    5/19/2024 2124 by Marlyn Newell LPN  Outcome: Progressing  Note: Patient denies thoughts of self-harm or harm to others during this shift. Staff encouraged patient to notify staff if thoughts of self-harm or harm to others occur. Staff ensure patient safety by intermediate checks for safety every 15 minutes.          Problem: Depression  Goal: Will be euthymic at discharge  Description: INTERVENTIONS:  1. Administer medication as ordered  2. Provide emotional support via 1:1 interaction with staff  3. Encourage involvement in milieu/groups/activities  4. Monitor for social isolation  5/19/2024 2124 by Marlyn Newell LPN  Outcome: Progressing  Note: Patient endorses depression & anxiety, yet described it as being manageable.  Staff ensure safety by providing safety checks on the unit intermittently and every 15 minutes. Patient is encouraged to notify staff if anxiety and depression occurs.

## 2024-05-20 NOTE — PLAN OF CARE
Problem: Depression  Goal: Will be euthymic at discharge  Description: INTERVENTIONS:  1. Administer medication as ordered  2. Provide emotional support via 1:1 interaction with staff  3. Encourage involvement in milieu/groups/activities  4. Monitor for social isolation  Outcome: Progressing      1:1 with pt x ten minutes.  Pt encouraged to attend unit programming and interact with peers and staff.  Pt also encouraged to tend to hygiene and ADLs.  Pt encouraged to discuss feelings with staff and feedback and reassurance provided.    Pt denies thoughts of self harm and is agreeable to seeking out should thoughts of self harm arise.  Safe environment maintained.  Q15 minute checks for safety cont per unit policy.  Will cont to monitor for safety and provides support and reassurance as needed.    Pt is controlled in behaviors. Compliant with medications. Depressed mood with some anxiety. Pt seclusive to room for long intervals.

## 2024-05-20 NOTE — GROUP NOTE
Group Therapy Note    Date: 5/20/2024    Group Start Time: 1000  Group End Time: 1041  Group Topic: Psychotherapy    [facility]    Zoë Garces MSW, ADRIANE        Group Therapy Note    Attendees: 11/20       Patient's Goal:  Recognizing symptoms of Anxiety, the types of Anxiety and treatments.      Status After Intervention:  Improved    Participation Level: Active Listener and Interactive    Participation Quality: Appropriate, Attentive, and Sharing      Speech:  normal      Thought Process/Content: Logical      Affective Functioning: Congruent      Mood: euthymic      Level of consciousness:  Alert, Oriented x4, and Attentive      Response to Learning: Able to verbalize current knowledge/experience, Able to verbalize/acknowledge new learning, and Able to retain information      Endings: None Reported    Modes of Intervention: Education, Support, and Socialization      Discipline Responsible: /Counselor      Signature:  RUSS Lion LSW

## 2024-05-21 VITALS
HEART RATE: 82 BPM | HEIGHT: 63 IN | SYSTOLIC BLOOD PRESSURE: 105 MMHG | TEMPERATURE: 97.3 F | RESPIRATION RATE: 14 BRPM | WEIGHT: 118 LBS | BODY MASS INDEX: 20.91 KG/M2 | DIASTOLIC BLOOD PRESSURE: 67 MMHG | OXYGEN SATURATION: 99 %

## 2024-05-21 PROCEDURE — 6370000000 HC RX 637 (ALT 250 FOR IP): Performed by: PSYCHIATRY & NEUROLOGY

## 2024-05-21 PROCEDURE — 99238 HOSP IP/OBS DSCHRG MGMT 30/<: CPT | Performed by: PSYCHIATRY & NEUROLOGY

## 2024-05-21 RX ORDER — ESCITALOPRAM OXALATE 5 MG/1
15 TABLET ORAL DAILY
Qty: 90 TABLET | Refills: 0 | Status: SHIPPED | OUTPATIENT
Start: 2024-05-22

## 2024-05-21 RX ADMIN — ESCITALOPRAM OXALATE 15 MG: 10 TABLET ORAL at 08:26

## 2024-05-21 ASSESSMENT — LIFESTYLE VARIABLES
HOW OFTEN DO YOU HAVE A DRINK CONTAINING ALCOHOL: 2-4 TIMES A MONTH
HOW MANY STANDARD DRINKS CONTAINING ALCOHOL DO YOU HAVE ON A TYPICAL DAY: 1 OR 2

## 2024-05-21 NOTE — BH NOTE
Patient stated she doesn't smoke. Nurse ontinue to reinforce the importance of tobacco cessation.

## 2024-05-21 NOTE — DISCHARGE INSTRUCTIONS
Information:  Medications:   Medication summary provided   I understand that I should take only the medications on my list.     -why and when I need to take each medicine.     -which side effects to watch for.     -that I should carry my medication information at all times in case of     Emergency situations.    I will take all of my medicines to follow up appointments.     -check with my physician or pharmacist before taking any new    Medication, over the counter product or drink alcohol.    -Ask about food, drug or dietary supplement interactions.    -discard old lists and update records with medication providers.    Notify Physician:  Notify physician if you notice:   Always call 911 if you feel your life is in danger  In case of an emergency call 911 immediately!  If 911 is not available call your local emergency medical system for help    Behavioral Health Follow Up:  Original Referral Source:ED  Discharge Diagnosis: Depression with suicidal ideation [F32.A, R45.851]  Recommendations for Level of Care:  follow up with all appointments,  Patient status at discharge: stable.   My hospital  was:  [facility]  Aftercare plan faxed:  Norberto Smith( [facility])   -faxed by: staff   -date:  5/21/24   -time:  13:30  Prescriptions:  Patient to  at [facility].          Suicidal Thoughts and Behavior: Care Instructions  Overview  You have been seen by a doctor because you've had thoughts of suicide or have harmed yourself. Your doctor and support team want to help keep you safe. Your team may include a , a , and a counselor.  People often think about suicide because they feel hopeless, helpless, or worthless. These feelings may come from having a mental health problem, such as depression. These problems can be treated.  It's important to remember that there are people who care about you. Your doctor and support team take your pain very seriously, and they want to

## 2024-05-21 NOTE — GROUP NOTE
Group Therapy Note    Date: 5/21/2024    Group Start Time: 1000  Group End Time: 1030  Group Topic: Psychoeducation    Charanjit Lunsford        Group Therapy Note    Attendees: 12/20       Patient's Goal:  PT will demonstrate increased interpersonal interaction and participate in group activities of discussing discharge planning.     Notes:  Patient is making progress, AEB participating in group discussion, actively listening, and supporting other group members    Status After Intervention:  Improved    Participation Level: Active Listener and Interactive    Participation Quality: Appropriate, Attentive, and Sharing      Speech:  normal      Thought Process/Content: Logical      Affective Functioning: Flat      Mood: depressed      Level of consciousness:  Alert, Oriented x4, and Attentive      Response to Learning: Able to verbalize/acknowledge new learning and Progressing to goal      Endings: None Reported    Modes of Intervention: Education, Support, and Socialization      Discipline Responsible: /Counselor      Signature:  Charanjit Chau

## 2024-05-21 NOTE — GROUP NOTE
Group Therapy Note    Date: 5/21/2024    Group Start Time: 0900  Group End Time: 0945  Group Topic: Community Meeting    Arely Cruz        Group Therapy Note    Attendees: 14/20         Patient's Goal:  Wants to ask doctor to go home    Notes:      Status After Intervention:  Unchanged    Participation Level: Active Listener    Participation Quality: Appropriate      Speech:  normal      Thought Process/Content: Logical      Affective Functioning: Congruent      Mood: anxious      Level of consciousness:  Oriented x4      Response to Learning: Able to verbalize current knowledge/experience and Progressing to goal      Endings: None Reported    Modes of Intervention: Education, Support, and Socialization      Discipline Responsible: Behavorial Health Tech      Signature:  Arely Howe     30 days given. Can update at visit with Dr. Jim Mcdaniel.

## 2024-05-21 NOTE — GROUP NOTE
Group Therapy Note    Date: 5/20/2024    Group Start Time: 2020  Group End Time: 2100  Group Topic: Wrap-Up    Vin Huber        Group Therapy Note    Attendees: 13 out of 19 Attended wrap up group at 2020, in group room.          Participation Level: Active Listener and Interactive    Participation Quality: Appropriate, Sharing, and Supportive      Speech:  normal      Thought Process/Content: Logical      Affective Functioning: Congruent      Level of consciousness:  Oriented x4      Response to Learning: Able to verbalize current knowledge/experience      Modes of Intervention: Support, Socialization, and Exploration      Discipline Responsible: Behavorial Health Tech      Signature:  VIN BARRON

## 2024-05-21 NOTE — DISCHARGE SUMMARY
Date/Time    TSH 4.80 05/18/2024 01:35 PM     No results found for: \"LITHIUM\"  No results found for: \"VALPROATE\", \"CBMZ\"    RISK ASSESSMENT AT DISCHARGE: Low risk for suicide and homicide.     Treatment Plan:  Reviewed current Medications with the patient. Education provided on the complaince with treatment.    Risks, benefits, side effects, drug-to-drug interactions and alternatives to treatment were discussed.    Encourage patient to attend outpatient follow up appointment and therapy.    Patient was advised to call the outpatient provider, visit the nearest ED or call 911 if symptoms are not manageable.        Medication List        CHANGE how you take these medications      escitalopram 5 MG tablet  Commonly known as: LEXAPRO  Take 3 tablets by mouth daily  Start taking on: May 22, 2024  What changed:   medication strength  how much to take               Where to Get Your Medications        These medications were sent to [facility] - [facility] [address] - P [phone] - F [phone]  [address], [place] [address]      Phone: [phone]   escitalopram 5 MG tablet               Core Measures statement:   Not applicable                                            Sophia Shultz is a 24 y.o. female being evaluated FACE TO FACE    --BING POWELL MD on 5/21/2024 at 10:48 AM    An electronic signature was used to authenticate this note.     **This report has been created using voice recognition software. It may contain minor errors which are inherent in voice recognition technology.**     Fair

## 2024-05-21 NOTE — PLAN OF CARE
Problem: Pain  Goal: Verbalizes/displays adequate comfort level or baseline comfort level  5/20/2024 2156 by Marlyn Newell LPN  Outcome: Progressing  Note: Patient denies pain. Patient is aware medications can be given upon request. Patient is oriented to notify staff of changes regarding pain. Staff continues to monitor patient.        Problem: Self Harm/Suicidality  Goal: Will have no self-injury during hospital stay  Description: INTERVENTIONS:  1.  Ensure constant observer at bedside with Q15M safety checks  2.  Maintain a safe environment  3.  Secure patient belongings  4.  Ensure family/visitors adhere to safety recommendations  5.  Ensure safety tray has been added to patient's diet order  6.  Every shift and PRN: Re-assess suicidal risk via Frequent Screener    5/20/2024 2156 by Marlyn Newell LPN  Outcome: Progressing  Note: Patient denies thoughts of self-harm or harm to others during this shift. Staff encouraged patient to notify staff if thoughts of self-harm or harm to others occur. Staff ensure patient safety by intermediate checks for safety every 15 minutes.          Problem: Depression  Goal: Will be euthymic at discharge  Description: INTERVENTIONS:  1. Administer medication as ordered  2. Provide emotional support via 1:1 interaction with staff  3. Encourage involvement in milieu/groups/activities  4. Monitor for social isolation  5/20/2024 2156 by Marlyn Newell LPN  Outcome: Progressing  Note: Patient endorses feelings of optimism. Patient reports doing well & functioning.

## 2024-05-21 NOTE — TRANSITION OF CARE
Behavioral Health Transition Record    Patient Name: Sophia Shultz  YOB: 2000   Medical Record Number: [id number]  Date of Admission: 5/18/2024  1:20 PM   Date of Discharge: 5/21/24    Attending Provider: Jesus Alberto Chu MD   Discharging Provider: Kimberley  To contact this individual call  [phone] and ask the  to page.  If unavailable, ask to be transferred to Behavioral Health Provider on call.  A Behavioral Health Provider will be available on call 24/7 and during holidays.    Primary Care Provider: No primary care provider on file.    No Known Allergies    Reason for Admission: Increased stressors. Full time student, 4 jobs. Lost her 2nd grandparent recently and has been feeling tremendous guilt that she wasn't more helpful.     Admission Diagnosis: Depression with suicidal ideation [F32.A, R45.851]    * No surgery found *    Results for orders placed or performed during the hospital encounter of 05/18/24   TOX SCR, BLD, ED   Result Value Ref Range    Acetaminophen Level <5 (L) 10 - 30 ug/mL    Ethanol Lvl <10 <10 mg/dL    Ethanol percent <0.010 %    Salicylate Lvl <1.0 (L) 3 - 10 mg/dL   CBC   Result Value Ref Range    WBC 9.4 3.5 - 11.0 k/uL    RBC 4.64 4.0 - 5.2 m/uL    Hemoglobin 13.8 12.0 - 16.0 g/dL    Hematocrit 42.5 36 - 46 %    MCV 91.8 80 - 100 fL    MCH 29.8 26 - 34 pg    MCHC 32.4 31 - 37 g/dL    RDW 14.8 11.5 - 14.9 %    Platelets 364 150 - 450 k/uL    MPV 7.8 6.0 - 12.0 fL   Comprehensive Metabolic Panel   Result Value Ref Range    Sodium 139 135 - 144 mmol/L    Potassium 4.0 3.7 - 5.3 mmol/L    Chloride 102 98 - 107 mmol/L    CO2 25 20 - 31 mmol/L    Anion Gap 12 9 - 17 mmol/L    Glucose 105 (H) 70 - 99 mg/dL    BUN 12 6 - 20 mg/dL    Creatinine 0.7 0.5 - 0.9 mg/dL    Est, Glom Filt Rate >90 >60 mL/min/1.73m2    Calcium 9.9 8.6 - 10.4 mg/dL    Total Protein 8.2 6.4 - 8.3 g/dL    Albumin 4.6 3.5 - 5.2 g/dL    Total Bilirubin 0.5 0.3 - 1.2 mg/dL    Alkaline Phosphatase 52 35 -

## 2024-05-21 NOTE — DISCHARGE INSTR - DIET

## 2024-05-21 NOTE — BH NOTE
[facility] [place]  Discharge Note    Pt discharged with followings belongings:   Dental Appliances: None  Vision - Corrective Lenses: None  Hearing Aid: None  Jewelry: Body Piercing, Earrings  Body Piercings Removed: No  Clothing: Shorts, Shirt, Undergarments, Footwear  Other Valuables: Other (Comment) ([place] license)   Valuables returned to patient. Patient educated on aftercare instructions: Yes  Information faxed to   Norberto Smith ( [facility]) by staff  at 2:29 PM .Patient verbalize understanding of AVS:   Patient denied suicidal  and homicide ideations. Patient discharge to home with her MOM VIA car. Patient given all personal belonging..    Status EXAM upon discharge:  Mental Status and Behavioral Exam  Normal: Yes  Level of Assistance: Independent/Self  Facial Expression: Brightened  Affect: Appropriate  Level of Consciousness: Alert  Frequency of Checks: 4 times per hour, close  Mood:Normal: Yes  Mood: Anxious  Motor Activity:Normal: Yes  Motor Activity: Other (comment)  Eye Contact: Good  Observed Behavior: Friendly  Sexual Misconduct History: Current - no  Preception: [place] to person, [place] to time, [place] to place  Attention:Normal: Yes  Thought Processes: Unremarkable  Thought Content:Normal: Yes  Thought Content: Other (comment)  Depression Symptoms: No problems reported or observed.  Anxiety Symptoms: Generalized  Bita Symptoms: No problems reported or observed.  Hallucinations: None  Delusions: No  Memory:Normal: Yes  Insight and Judgment: Yes  Insight and Judgment: Poor judgment    Tobacco Screening:  Practical Counseling, on admission, chemo X, if applicable and completed (first 3 are required if patient doesn't refuse):            ( ) Recognizing danger situations (included triggers and roadblocks)                    ( ) Coping skills (new ways to manage stress,relaxation techniques, changing routine, distraction)                                                           ( ) Basic

## 2024-05-21 NOTE — GROUP NOTE
Group Therapy Note    Date: 5/21/2024    Group Start Time: 1100  Group End Time: 1155  Group Topic: Cognitive Skills    [facility] Cher Badillo CTRS        Group Therapy Note    Attendees: 13/19     Topic: To increase socialization and practice problem solving, and   communication skills        Notes:  Pt was pleasant and cooperative, and participated fully in group task. Pt was   able practice problem solving, and communication skills independently.         Status After Intervention:  Improved        Participation Level: Active Listener and Interactive r/t  task and topic, supportive        Participation Quality: Appropriate, Attentive, engaged r/t task ,supportive        Speech:  Normal         Thought Process/Content: Logical, linear r/t task .          Affective Functioning: Congruent, brightened         Mood: Cooperative, euthymic          Level of consciousness:  Alert, and Attentive         Response to Learning: Able to verbalize current knowledge/experience, Able to   verbalize/acknowledge new learning, and Progressing to goal         Endings: None Reported      Modes of Intervention: Education, Support, Socialization, and Problem-solving    Discipline Responsible: Psychoeducational Specialist      Signature:  SHANIKA LLAMAS

## 2025-08-11 ENCOUNTER — HOSPITAL ENCOUNTER (EMERGENCY)
Age: 25
Discharge: HOME | End: 2025-08-11
Attending: STUDENT IN AN ORGANIZED HEALTH CARE EDUCATION/TRAINING PROGRAM
Payer: COMMERCIAL

## 2025-08-11 VITALS
RESPIRATION RATE: 18 BRPM | HEIGHT: 62 IN | SYSTOLIC BLOOD PRESSURE: 125 MMHG | TEMPERATURE: 98.8 F | OXYGEN SATURATION: 99 % | DIASTOLIC BLOOD PRESSURE: 94 MMHG | HEART RATE: 69 BPM | BODY MASS INDEX: 22.08 KG/M2 | WEIGHT: 120 LBS

## 2025-08-11 DIAGNOSIS — T14.8XXA ANIMAL BITE: Primary | ICD-10-CM

## 2025-08-11 PROCEDURE — 6360000002 HC RX W HCPCS: Performed by: STUDENT IN AN ORGANIZED HEALTH CARE EDUCATION/TRAINING PROGRAM

## 2025-08-11 PROCEDURE — 12001 RPR S/N/AX/GEN/TRNK 2.5CM/<: CPT | Performed by: STUDENT IN AN ORGANIZED HEALTH CARE EDUCATION/TRAINING PROGRAM

## 2025-08-11 PROCEDURE — 6370000000 HC RX 637 (ALT 250 FOR IP): Performed by: STUDENT IN AN ORGANIZED HEALTH CARE EDUCATION/TRAINING PROGRAM

## 2025-08-11 PROCEDURE — 90471 IMMUNIZATION ADMIN: CPT | Performed by: STUDENT IN AN ORGANIZED HEALTH CARE EDUCATION/TRAINING PROGRAM

## 2025-08-11 PROCEDURE — 99284 EMERGENCY DEPT VISIT MOD MDM: CPT | Performed by: STUDENT IN AN ORGANIZED HEALTH CARE EDUCATION/TRAINING PROGRAM

## 2025-08-11 PROCEDURE — 90715 TDAP VACCINE 7 YRS/> IM: CPT | Performed by: STUDENT IN AN ORGANIZED HEALTH CARE EDUCATION/TRAINING PROGRAM

## 2025-08-11 PROCEDURE — 6360000002 HC RX W HCPCS: Performed by: EMERGENCY MEDICINE

## 2025-08-11 RX ORDER — LIDOCAINE HYDROCHLORIDE 10 MG/ML
5 INJECTION, SOLUTION INFILTRATION; PERINEURAL ONCE
Status: DISCONTINUED | OUTPATIENT
Start: 2025-08-11 | End: 2025-08-11

## 2025-08-11 RX ORDER — OMEGA-3S/DHA/EPA/FISH OIL/D3 300MG-1000
400 CAPSULE ORAL DAILY
COMMUNITY

## 2025-08-11 RX ADMIN — Medication 3 ML: at 20:27

## 2025-08-11 RX ADMIN — AMOXICILLIN AND CLAVULANATE POTASSIUM 1 TABLET: 875; 125 TABLET, FILM COATED ORAL at 20:25

## 2025-08-11 RX ADMIN — LIDOCAINE HYDROCHLORIDE 5 ML: 10 INJECTION, SOLUTION INFILTRATION; PERINEURAL at 21:47

## 2025-08-11 RX ADMIN — TETANUS TOXOID, REDUCED DIPHTHERIA TOXOID AND ACELLULAR PERTUSSIS VACCINE, ADSORBED 0.5 ML: 5; 2.5; 8; 8; 2.5 SUSPENSION INTRAMUSCULAR at 20:21

## 2025-08-11 ASSESSMENT — PAIN DESCRIPTION - LOCATION
LOCATION: HAND
LOCATION: HAND

## 2025-08-11 ASSESSMENT — PAIN - FUNCTIONAL ASSESSMENT
PAIN_FUNCTIONAL_ASSESSMENT: 0-10
PAIN_FUNCTIONAL_ASSESSMENT: 0-10

## 2025-08-11 ASSESSMENT — PAIN DESCRIPTION - ORIENTATION: ORIENTATION: RIGHT

## 2025-08-11 ASSESSMENT — PAIN SCALES - GENERAL
PAINLEVEL_OUTOF10: 3
PAINLEVEL_OUTOF10: 4